# Patient Record
Sex: FEMALE | Race: BLACK OR AFRICAN AMERICAN | NOT HISPANIC OR LATINO | URBAN - METROPOLITAN AREA
[De-identification: names, ages, dates, MRNs, and addresses within clinical notes are randomized per-mention and may not be internally consistent; named-entity substitution may affect disease eponyms.]

---

## 2024-10-24 ENCOUNTER — EMERGENCY (EMERGENCY)
Facility: HOSPITAL | Age: 39
LOS: 1 days | Discharge: HOME | End: 2024-10-24
Attending: EMERGENCY MEDICINE
Payer: COMMERCIAL

## 2024-10-24 VITALS
DIASTOLIC BLOOD PRESSURE: 97 MMHG | OXYGEN SATURATION: 99 % | TEMPERATURE: 98 F | HEART RATE: 90 BPM | HEIGHT: 69 IN | RESPIRATION RATE: 20 BRPM | SYSTOLIC BLOOD PRESSURE: 148 MMHG

## 2024-10-24 PROCEDURE — 99283 EMERGENCY DEPT VISIT LOW MDM: CPT

## 2024-10-24 PROCEDURE — 99282 EMERGENCY DEPT VISIT SF MDM: CPT

## 2024-10-24 RX ORDER — ACETAMINOPHEN 325 MG
650 TABLET ORAL ONCE
Refills: 0 | Status: COMPLETED | OUTPATIENT
Start: 2024-10-24 | End: 2024-10-24

## 2024-10-24 RX ADMIN — Medication 400 MILLIGRAM(S): at 13:42

## 2024-10-24 RX ADMIN — Medication 650 MILLIGRAM(S): at 13:42

## 2024-10-24 NOTE — ED PROVIDER NOTE - OBJECTIVE STATEMENT
Yolette Bassett is a 38 yo female presenting at the behest of her den Yolette Bassett is a 38 yo female presenting at the behest of her dentist for a finding on dental x-ray c/f infection. She had a top molar dental extraction 1 month ago for a chipped tooth where the finding was noticed. She took a week of amoxicillin for a lucency seen at the base off her most posterior lower right molar. She followed-up with her dentist this week, and the finding was seen again. She was told to follow-up with a doctor immediately. She has no pain at the back molat and denies drainage, fevers. She has ahd some intermittent global headaches and pain at the extraction site for which she has taken aleve Yolette Bassett is a 40 yo female presenting at the behest of her dentist for a finding on dental x-ray c/f infection. She had a top molar dental extraction 1 month ago for a chipped tooth where the finding was noticed. She took a week of amoxicillin for a lucency seen at the base off her most posterior lower right molar. She followed-up with her dentist this week, and the finding was seen again. She was told to follow-up with a doctor immediately. She has no pain at the back molat and denies drainage, fevers. She has had some intermittent global headaches and pain at the extraction site for which she has taken aleve

## 2024-10-24 NOTE — ED PROVIDER NOTE - ATTENDING CONTRIBUTION TO CARE
I performed a history and physical exam of the patient and discussed their management with the resident and /or advanced care provider. I reviewed the resident and /or ACP's note and agree with the documented findings and plan of care. My medical decision making and observations are found above.  Lungs clear, abd soft, nl jaw motion, no neck lymph nodes, no gum redness or swelling.

## 2024-10-24 NOTE — ED PROVIDER NOTE - CLINICAL SUMMARY MEDICAL DECISION MAKING FREE TEXT BOX
James, PGY1: Yolette Bassett is a 40 yo female presenting at the behest of her dentist for a finding on dental x-ray c/f infection (a lucency seen at the base off her most posterior lower right molar.) after an extraction molar dental extraction 1 month ago for a chipped tooth where the finding was noticed and associated intermittent HA. She denies fevers or systemic symptoms. Exam of oral cavity is unremarkable with no signs of redness, drainage, and back molar and surrounding tissue is not TTP. Will give pain medications and get f/u with dental surgeon for further management., James, PGY1: Yolette Bassett is a 40 yo female presenting at the behest of her dentist for a finding on dental x-ray c/f infection (a lucency seen at the base off her most posterior lower right molar.) after an extraction molar dental extraction 1 month ago for a chipped tooth where the finding was noticed and associated intermittent HA. She denies fevers or systemic symptoms. Exam of oral cavity is unremarkable with no signs of redness, drainage, and back molar and surrounding tissue is not TTP. Low concern for infection at this time. Will give pain medications and get f/u with dental surgeon for further management., James, PGY1: Yolette Bassett is a 40 yo female presenting at the behest of her dentist for a finding on dental x-ray c/f infection (a lucency seen at the base off her most posterior lower right molar.) after an extraction molar dental extraction 1 month ago for a chipped tooth where the finding was noticed and associated intermittent HA. She denies fevers or systemic symptoms. Exam of oral cavity is unremarkable with no signs of redness, drainage, and back molar and surrounding tissue is not TTP. Low concern for infection at this time. Will give pain medications and get f/u with dental surgeon for further management.Ousmane: Patient is a 39-year-old female who presents to the emergency department with headache pain at old extraction site.  Patient had a tooth extracted in September has had pain on and off in that site since then.  Patient saw dentist and had x-rays done and was told that it was concerning for deeper process.  Patient here with no fevers no redness around the either the extracted tooth or the area on x-ray identified as concerning.  Patient able to move her jaw and speak normally.  As there is no acute signs of infection with this patient a dentist would be the best person further pursue the x-ray findings of the first dentist.  Will treat patient's pain and symptoms and help patient get referral to dentist.

## 2024-10-24 NOTE — ED PROVIDER NOTE - NSFOLLOWUPCLINICS_GEN_ALL_ED_FT
[facility]  Dentistry  .  [place] [address]  Phone: [phone]  Fax:     [facility] [place] [address]  Phone: [phone]  Fax:

## 2024-10-24 NOTE — ED PROVIDER NOTE - PHYSICAL EXAMINATION
Const: Awake, alert, no acute distress.  Well appearing.  Moving comfortably on stretcher.  HEENT: NC/AT.  Moist mucous membranes. Missing  1 molar in top right side of mouth with no gum changes, redness, drainainge. No gum changes thoughout mouth or at the back right molar. No pus or drainage appreciated. Somt TTP of outside r jaw. Can fully open and close mouth. no difficulty swallowing.  Eyes: Extraocular movements intact b/l.  No scleral icterus.  Neck: Full ROM without pain.  Cardiac: Extremities well perfused, normal coloration, no peripheral cyanosis.  No peripheral edema.  Resp: Speaking in full sentences.  No evidence of respiratory distress.  Abd: Non-distended.  Skin: Normal coloration.  No rashes, abrasions or lacerations.  Neuro: Awake, alert & oriented x 3.  Moves all extremities symmetrically.  No obvious focal deficits.

## 2024-10-24 NOTE — ED PROVIDER NOTE - NSPTACCESSSVCSAPPTDETAILS_ED_ALL_ED_FT
Please give close follow-up with [facility] and/or Dental surgery for further work-up of possible dental abcess. Please give close follow-up with [facility] and/or Dental surgery for further work-up of possible dental abscess.

## 2024-10-24 NOTE — ED PROVIDER NOTE - PATIENT PORTAL LINK FT
You can access the FollowMyHealth Patient Portal offered by [facility] by registering at the following website: [email or web address]. By joining [facility] FollowMyHealth portal, you will also be able to view your health information using other applications (apps) compatible with our system.

## 2024-10-24 NOTE — ED PROVIDER NOTE - NSFOLLOWUPINSTRUCTIONS_ED_ALL_ED_FT
There were no signs of an emergency medical condition on completion of today's workup.  You will need further medical care and evaluation.    You can take tylenol, ibuprofen, or other over the counter anti-inflammatories for your pain. You can also try over-the-counter topical orajel to numb the gums.    If you are having pain, take Tylenol/acetaminophen 1 g every six hours and supplement (if allowed by your physician, and if you're not having gastric/gastrointestinal/stomach/intestinal problems) with ibuprofen 600 mg, with food  every six hours which can be taken three hours apart from the Tylenol to have a layered effect.     Be sure to take no more than 3000mg or 3g of Tylenol/acetaminophen in a 24 hour period. Be sure to check your other medications to see if they include Tylenol/acetaminophen and include them in your calculations to ensure you do not take more than 3000mg or 3g of Tylenol/acetaminophen a day.    Our discharge clinic will call you in 1-2 days to set-up an appointment with a dentist.    If you develop fevers, difficulty chewing/swallowing, notice drainage from the gums, please return to the emergency room.  Return for any persistent, worsening symptoms, or ANY concerns at all. There were no signs of an emergency medical condition on completion of today's workup.  You will need further medical care and evaluation.    You can take tylenol, ibuprofen, or other over the counter anti-inflammatories for your pain. You can also try over-the-counter topical orajel to numb the gums.    If you are having pain, take Tylenol/acetaminophen 1 g every six hours and supplement (if allowed by your physician, and if you're not having gastric/gastrointestinal/stomach/intestinal problems) with ibuprofen 600 mg, with food  every six hours which can be taken three hours apart from the Tylenol to have a layered effect.     Be sure to take no more than 3000mg or 3g of Tylenol/acetaminophen in a 24 hour period. Be sure to check your other medications to see if they include Tylenol/acetaminophen and include them in your calculations to ensure you do not take more than 3000mg or 3g of Tylenol/acetaminophen a day.    Our discharge clinic will call you in 1-2 days to set-up an appointment with a dentist.    Oral and maxillofacial surgery with [facility]:   Phone:  [phone]  Location:  [address] [place] [address]    If you develop fevers, difficulty chewing/swallowing, notice drainage from the gums, please return to the emergency room.    Return for any persistent, worsening symptoms, or ANY concerns at all.

## 2024-10-30 ENCOUNTER — APPOINTMENT (OUTPATIENT)
Age: 39
End: 2024-10-30
Payer: MEDICAID

## 2024-11-06 ENCOUNTER — APPOINTMENT (OUTPATIENT)
Age: 39
End: 2024-11-06

## 2024-11-06 PROBLEM — F32.9 MAJOR DEPRESSIVE DISORDER, SINGLE EPISODE, UNSPECIFIED: Chronic | Status: ACTIVE | Noted: 2024-10-24

## 2024-11-12 ENCOUNTER — APPOINTMENT (OUTPATIENT)
Age: 39
End: 2024-11-12